# Patient Record
Sex: MALE | Race: WHITE
[De-identification: names, ages, dates, MRNs, and addresses within clinical notes are randomized per-mention and may not be internally consistent; named-entity substitution may affect disease eponyms.]

---
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
## 2023-08-16 ENCOUNTER — OUTPATIENT (OUTPATIENT)
Dept: OUTPATIENT SERVICES | Age: 18
LOS: 1 days | Discharge: HOME | End: 2023-08-16

## 2023-08-17 ENCOUNTER — APPOINTMENT (OUTPATIENT)
Dept: PEDIATRIC HEMATOLOGY/ONCOLOGY | Facility: CLINIC | Age: 18
End: 2023-08-17
Payer: COMMERCIAL

## 2023-08-17 ENCOUNTER — RESULT REVIEW (OUTPATIENT)
Age: 18
End: 2023-08-17

## 2023-08-17 VITALS
BODY MASS INDEX: 23.25 KG/M2 | RESPIRATION RATE: 18 BRPM | SYSTOLIC BLOOD PRESSURE: 118 MMHG | HEART RATE: 80 BPM | WEIGHT: 146.39 LBS | OXYGEN SATURATION: 98 % | TEMPERATURE: 98.24 F | HEIGHT: 66.46 IN | DIASTOLIC BLOOD PRESSURE: 67 MMHG

## 2023-08-17 DIAGNOSIS — E55.9 VITAMIN D DEFICIENCY, UNSPECIFIED: ICD-10-CM

## 2023-08-17 DIAGNOSIS — D72.821 MONOCYTOSIS (SYMPTOMATIC): ICD-10-CM

## 2023-08-17 DIAGNOSIS — C86.6 PRIMARY CUTANEOUS CD30-POSITIVE T-CELL PROLIFERATIONS: ICD-10-CM

## 2023-08-17 LAB
BASOPHILS # BLD AUTO: 0.09 K/UL — SIGNIFICANT CHANGE UP (ref 0–0.2)
BASOPHILS NFR BLD AUTO: 1.2 % — SIGNIFICANT CHANGE UP (ref 0–2)
EOSINOPHIL # BLD AUTO: 0.23 K/UL — SIGNIFICANT CHANGE UP (ref 0–0.5)
EOSINOPHIL NFR BLD AUTO: 2.9 % — SIGNIFICANT CHANGE UP (ref 0–6)
HCT VFR BLD CALC: 44.5 % — SIGNIFICANT CHANGE UP (ref 39–50)
HGB BLD-MCNC: 15.2 G/DL — SIGNIFICANT CHANGE UP (ref 13–17)
IANC: 3.7 K/UL — SIGNIFICANT CHANGE UP (ref 1.8–7.4)
IMM GRANULOCYTES NFR BLD AUTO: 3.5 % — HIGH (ref 0–0.9)
LYMPHOCYTES # BLD AUTO: 2.29 K/UL — SIGNIFICANT CHANGE UP (ref 1–3.3)
LYMPHOCYTES # BLD AUTO: 29.4 % — SIGNIFICANT CHANGE UP (ref 13–44)
MCHC RBC-ENTMCNC: 29.2 PG — SIGNIFICANT CHANGE UP (ref 27–34)
MCHC RBC-ENTMCNC: 34.2 GM/DL — SIGNIFICANT CHANGE UP (ref 32–36)
MCV RBC AUTO: 85.4 FL — SIGNIFICANT CHANGE UP (ref 80–100)
MONOCYTES # BLD AUTO: 1.22 K/UL — HIGH (ref 0–0.9)
MONOCYTES NFR BLD AUTO: 15.6 % — HIGH (ref 2–14)
NEUTROPHILS # BLD AUTO: 3.7 K/UL — SIGNIFICANT CHANGE UP (ref 1.8–7.4)
NEUTROPHILS NFR BLD AUTO: 47.4 % — SIGNIFICANT CHANGE UP (ref 43–77)
NRBC # BLD: 0 /100 WBCS — SIGNIFICANT CHANGE UP (ref 0–0)
PLATELET # BLD AUTO: 226 K/UL — SIGNIFICANT CHANGE UP (ref 150–400)
PMV BLD: 11.6 FL — SIGNIFICANT CHANGE UP (ref 7–13)
RBC # BLD: 5.21 M/UL — SIGNIFICANT CHANGE UP (ref 4.2–5.8)
RBC # FLD: 12.2 % — SIGNIFICANT CHANGE UP (ref 10.3–14.5)
WBC # BLD: 7.8 K/UL — SIGNIFICANT CHANGE UP (ref 3.8–10.5)
WBC # FLD AUTO: 7.8 K/UL — SIGNIFICANT CHANGE UP (ref 3.8–10.5)

## 2023-08-17 PROCEDURE — 99215 OFFICE O/P EST HI 40 MIN: CPT

## 2023-08-17 NOTE — PHYSICAL EXAM
[No focal deficits] : no focal deficits [Gait normal] : gait normal [Normal] : affect appropriate [100: Fully active, normal.] : 100: Fully active, normal. [de-identified] : Drew has scattered lesions on his back, abdomen, forehead, and lower legs that are mostly scare. Multiple scars.

## 2023-08-17 NOTE — HISTORY OF PRESENT ILLNESS
[de-identified] : \victor manuel Russell is being followed for lymphomatoid papulomatosis for which he was diagnosed at age 2.5 in 2006.\victor manuel Russell was referred to our clinic for reevaluation of his lymphomatoid papulosis in October 2011. He was originally diagnosed with this illness in March 2006. At the time of initial consultation his rash was extensive but was following the usual cyclic pattern of this disease. However, his family is very concerned regarding his diagnosis and would like to pursue any treatments available to improve his rash. We reviewed at length that lymphomatoid papulosis has an incidence of 1 per million. It generally has a chronic, indolent course. The cycles last 2-8 weeks are can last for decades. There are no associated constitutional symptoms. If any lesions remain ulcerated and do not cycle those lesions should be re-evaluated and possibly biopsied. Reportedly there is approximately 10% association with/ risk of malignancy with cutaneous anaplastic large cell lymphoma but treating the lesions does not change the natural history of the disease. Furthermore, there are currently no predictive features to define who is at higher risk for lymphoma. [de-identified] : Drew comes for routine annual follow up today. He reports throughout the past year his lesions have been stable and following the same pattern of flaring and going away.  no lesions stuck all seem to still change. No new allergies or medications. 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 studying computer science lots of questions about disease and risk of malignancy vitamin d 5000 IU weekly level was 24 at pmd  lesion onr right had was larger than usual but has now scared

## 2023-08-17 NOTE — CONSULT LETTER
[Dear  ___] : Dear  [unfilled], [Courtesy Letter:] : I had the pleasure of seeing your patient, [unfilled], in my office today. [Please see my note below.] : Please see my note below. [Consult Closing:] : Thank you very much for allowing me to participate in the care of this patient.  If you have any questions, please do not hesitate to contact me. [Sincerely,] : Sincerely, [DrNiles  ___] : Dr. HONEYCUTT [FreeTextEntry2] : Sapna Pat M.D.\par  [address] [facility] [address]\[place]  [address]\par  Tel.#: [phone]\par  Fax #[phone]\par   [FreeTextEntry3] : Adina Schwab, MEI, LASTC.\par  Physician Assistant/Clinical Care Coordinator\par  Pediatric Oncology Rare Tumor and Sarcoma (PORTS) Program\par  Vascular Anomalies Program and Oncology Focus Leukemia and Lymphoma\par  [facility]\par  [address]\[place] [address]\par  [email or web address]\par  \par  \par  Lizzie Marmolejo MD \par  Head, Pediatric Oncology Rare Tumor and Sarcoma Program\par  [facility] \par   of Pediatrics\par  [facility] [employer]\par  [email or web address]

## 2023-08-18 DIAGNOSIS — E55.9 VITAMIN D DEFICIENCY, UNSPECIFIED: ICD-10-CM

## 2023-08-18 DIAGNOSIS — C86.6 PRIMARY CUTANEOUS CD30-POSITIVE T-CELL PROLIFERATIONS: ICD-10-CM

## 2023-08-18 DIAGNOSIS — D72.821 MONOCYTOSIS (SYMPTOMATIC): ICD-10-CM

## 2024-08-12 ENCOUNTER — OUTPATIENT (OUTPATIENT)
Dept: OUTPATIENT SERVICES | Age: 19
LOS: 1 days | Discharge: HOME | End: 2024-08-12

## 2024-08-13 ENCOUNTER — APPOINTMENT (OUTPATIENT)
Dept: PEDIATRIC HEMATOLOGY/ONCOLOGY | Facility: CLINIC | Age: 19
End: 2024-08-13
Payer: COMMERCIAL

## 2024-08-13 ENCOUNTER — RESULT REVIEW (OUTPATIENT)
Age: 19
End: 2024-08-13

## 2024-08-13 VITALS
OXYGEN SATURATION: 98 % | HEART RATE: 70 BPM | TEMPERATURE: 97.7 F | BODY MASS INDEX: 23.81 KG/M2 | DIASTOLIC BLOOD PRESSURE: 58 MMHG | SYSTOLIC BLOOD PRESSURE: 105 MMHG | HEIGHT: 66.3 IN | WEIGHT: 148.15 LBS | RESPIRATION RATE: 20 BRPM

## 2024-08-13 DIAGNOSIS — C86.6 PRIMARY CUTANEOUS CD30-POSITIVE T-CELL PROLIFERATIONS: ICD-10-CM

## 2024-08-13 LAB
BASOPHILS # BLD AUTO: 0.07 K/UL — SIGNIFICANT CHANGE UP (ref 0–0.2)
BASOPHILS NFR BLD AUTO: 1.1 % — SIGNIFICANT CHANGE UP (ref 0–2)
EOSINOPHIL # BLD AUTO: 0.22 K/UL — SIGNIFICANT CHANGE UP (ref 0–0.5)
EOSINOPHIL NFR BLD AUTO: 3.3 % — SIGNIFICANT CHANGE UP (ref 0–6)
HCT VFR BLD CALC: 44.2 % — SIGNIFICANT CHANGE UP (ref 39–50)
HGB BLD-MCNC: 15.1 G/DL — SIGNIFICANT CHANGE UP (ref 13–17)
IANC: 3.66 K/UL — SIGNIFICANT CHANGE UP (ref 1.8–7.4)
IMM GRANULOCYTES NFR BLD AUTO: 1.5 % — HIGH (ref 0–0.9)
LYMPHOCYTES # BLD AUTO: 1.76 K/UL — SIGNIFICANT CHANGE UP (ref 1–3.3)
LYMPHOCYTES # BLD AUTO: 26.6 % — SIGNIFICANT CHANGE UP (ref 13–44)
MCHC RBC-ENTMCNC: 30.1 PG — SIGNIFICANT CHANGE UP (ref 27–34)
MCHC RBC-ENTMCNC: 34.2 GM/DL — SIGNIFICANT CHANGE UP (ref 32–36)
MCV RBC AUTO: 88.2 FL — SIGNIFICANT CHANGE UP (ref 80–100)
MONOCYTES # BLD AUTO: 0.81 K/UL — SIGNIFICANT CHANGE UP (ref 0–0.9)
MONOCYTES NFR BLD AUTO: 12.2 % — SIGNIFICANT CHANGE UP (ref 2–14)
NEUTROPHILS # BLD AUTO: 3.66 K/UL — SIGNIFICANT CHANGE UP (ref 1.8–7.4)
NEUTROPHILS NFR BLD AUTO: 55.3 % — SIGNIFICANT CHANGE UP (ref 43–77)
NRBC # BLD: 0 /100 WBCS — SIGNIFICANT CHANGE UP (ref 0–0)
PLATELET # BLD AUTO: 189 K/UL — SIGNIFICANT CHANGE UP (ref 150–400)
PMV BLD: 11.6 FL — SIGNIFICANT CHANGE UP (ref 7–13)
RBC # BLD: 5.01 M/UL — SIGNIFICANT CHANGE UP (ref 4.2–5.8)
RBC # FLD: 12.3 % — SIGNIFICANT CHANGE UP (ref 10.3–14.5)
WBC # BLD: 6.62 K/UL — SIGNIFICANT CHANGE UP (ref 3.8–10.5)
WBC # FLD AUTO: 6.62 K/UL — SIGNIFICANT CHANGE UP (ref 3.8–10.5)

## 2024-08-13 PROCEDURE — 99214 OFFICE O/P EST MOD 30 MIN: CPT

## 2024-08-15 NOTE — PHYSICAL EXAM
[No focal deficits] : no focal deficits [Gait normal] : gait normal [Normal] : affect appropriate [100: Fully active, normal.] : 100: Fully active, normal. [de-identified] : acne on the face vs papulosis and the back as well [de-identified] : Drew has scattered lesions on his back, abdomen, forehead, and lower legs that are mostly scars. Multiple scars. active lesion on clavicle and in arm pit and questionable eyelid

## 2024-08-15 NOTE — HISTORY OF PRESENT ILLNESS
[de-identified] : Drew comes for routine annual follow up today. graduated [employer] studying [address] science following with dermatology for acne gave oral medication minocycline but didn't tell derm about skin disorder recently started was on the minocycline in February  now with one on cheek that looks like Lp but previously face was usually clear feel like more outbreaks recently but each lesion is still cycling feels like 2 months ago that the lesions were the worse   [de-identified] : \victor manuel Russell is being followed for lymphomatoid papulomatosis for which he was diagnosed at age 2.5 in 2006.\victor manuel Russell was referred to our clinic for reevaluation of his lymphomatoid papulosis in October 2011. He was originally diagnosed with this illness in March 2006. At the time of initial consultation his rash was extensive but was following the usual cyclic pattern of this disease. However, his family is very concerned regarding his diagnosis and would like to pursue any treatments available to improve his rash. We reviewed at length that lymphomatoid papulosis has an incidence of 1 per million. It generally has a chronic, indolent course. The cycles last 2-8 weeks are can last for decades. There are no associated constitutional symptoms. If any lesions remain ulcerated and do not cycle those lesions should be re-evaluated and possibly biopsied. Reportedly there is approximately 10% association with/ risk of malignancy with cutaneous anaplastic large cell lymphoma but treating the lesions does not change the natural history of the disease. Furthermore, there are currently no predictive features to define who is at higher risk for lymphoma.

## 2024-08-15 NOTE — PHYSICAL EXAM
[No focal deficits] : no focal deficits [Gait normal] : gait normal [Normal] : affect appropriate [100: Fully active, normal.] : 100: Fully active, normal. [de-identified] : acne on the face vs papulosis and the back as well [de-identified] : Drew has scattered lesions on his back, abdomen, forehead, and lower legs that are mostly scars. Multiple scars. active lesion on clavicle and in arm pit and questionable eyelid

## 2024-08-15 NOTE — HISTORY OF PRESENT ILLNESS
[de-identified] : \victor manuel Russell is being followed for lymphomatoid papulomatosis for which he was diagnosed at age 2.5 in 2006.\victor manuel Russell was referred to our clinic for reevaluation of his lymphomatoid papulosis in October 2011. He was originally diagnosed with this illness in March 2006. At the time of initial consultation his rash was extensive but was following the usual cyclic pattern of this disease. However, his family is very concerned regarding his diagnosis and would like to pursue any treatments available to improve his rash. We reviewed at length that lymphomatoid papulosis has an incidence of 1 per million. It generally has a chronic, indolent course. The cycles last 2-8 weeks are can last for decades. There are no associated constitutional symptoms. If any lesions remain ulcerated and do not cycle those lesions should be re-evaluated and possibly biopsied. Reportedly there is approximately 10% association with/ risk of malignancy with cutaneous anaplastic large cell lymphoma but treating the lesions does not change the natural history of the disease. Furthermore, there are currently no predictive features to define who is at higher risk for lymphoma. [de-identified] : Drew comes for routine annual follow up today. graduated [employer] studying [employer] following with dermatology for acne gave oral medication minocycline but didn't tell derm about skin disorder recently started was on the minocycline in February  now with one on cheek that looks like Lp but previously face was usually clear feel like more outbreaks recently but each lesion is still cycling feels like 2 months ago that the lesions were the worse

## 2024-08-15 NOTE — CONSULT LETTER
[Dear  ___] : Dear  [unfilled], [Courtesy Letter:] : I had the pleasure of seeing your patient, [unfilled], in my office today. [Please see my note below.] : Please see my note below. [Consult Closing:] : Thank you very much for allowing me to participate in the care of this patient.  If you have any questions, please do not hesitate to contact me. [Sincerely,] : Sincerely, [FreeTextEntry2] : Nori Mcdermott MD [FreeTextEntry3] : Lizzie Marmolejo MD  Head, Pediatric Oncology Solid Tumor Program [facility]   of Pediatrics [facility] [employer] at [email or web address]

## 2024-08-15 NOTE — CONSULT LETTER
[Dear  ___] : Dear  [unfilled], [Courtesy Letter:] : I had the pleasure of seeing your patient, [unfilled], in my office today. [Please see my note below.] : Please see my note below. [Consult Closing:] : Thank you very much for allowing me to participate in the care of this patient.  If you have any questions, please do not hesitate to contact me. [Sincerely,] : Sincerely, [FreeTextEntry2] : Nori Mcdermott MD [FreeTextEntry3] : Lizzie Marmolejo MD  Head, Pediatric Oncology Solid Tumor Program [facility]   of Pediatrics [facility] [employer] at [facility]/Symone [email or web address]

## 2024-08-16 DIAGNOSIS — C86.6 PRIMARY CUTANEOUS CD30-POSITIVE T-CELL PROLIFERATIONS: ICD-10-CM

## 2024-09-03 ENCOUNTER — NON-APPOINTMENT (OUTPATIENT)
Age: 19
End: 2024-09-03

## 2024-09-04 ENCOUNTER — APPOINTMENT (OUTPATIENT)
Dept: DERMATOLOGY | Facility: CLINIC | Age: 19
End: 2024-09-04
Payer: COMMERCIAL

## 2024-09-04 DIAGNOSIS — L70.9 ACNE, UNSPECIFIED: ICD-10-CM

## 2024-09-04 PROCEDURE — G2211 COMPLEX E/M VISIT ADD ON: CPT

## 2024-09-04 PROCEDURE — 99205 OFFICE O/P NEW HI 60 MIN: CPT

## 2024-09-04 RX ORDER — ISOTRETINOIN 30 MG/1
30 CAPSULE ORAL DAILY
Qty: 1 | Refills: 0 | Status: ACTIVE | COMMUNITY
Start: 2024-09-04 | End: 1900-01-01

## 2024-09-08 NOTE — HISTORY OF PRESENT ILLNESS
[FreeTextEntry1] : np [de-identified] : 17 yo M with longstanding hx of LyP since early childhood (diagnosed March 2006), s/p multiple skin biopsies most recently in 2011, prev followed with Dr. Arriaga, here to establish care.   Has tried topical steroids in the past and they never helped. Continues to get scattered lesions throughout the body. Overall feels well  Also with acne resulting in scarring. tried tretinoin in the past but it didnt help.

## 2024-09-08 NOTE — PHYSICAL EXAM
[FreeTextEntry3] : on the arms, trunk and legs are scattered pink crusted papules, some clustered together, in various stages of healing, no LAD, no HSM  face with inflammatory papules and scarring

## 2024-09-08 NOTE — ASSESSMENT
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 Decision made to trial oral retinoid (for acne, see below) as there have been some sucessfull reports in treating LyP.  May strongly consider nbUVB if not responding to accutane Might need repeat biopsy to monitor for transformation, needs TBSE at least 1-2 times a year Photos taken, will likely refer to Dr. Abbott after treating acne (see below)  #acne, scarring, inflammatory - We have discussed the nature and course of this condition. Diagnosis and treatment options discussed, favor systemic medication with isotretinoin.   - IPLEDGE       - Patient weight: 67 kg      - aim for cumulative dose 120-150 mg/kg.  Occasionally,higher cumulative doses (closer > 220 mg/kg) are required for clearance)      -START isotretinoin 30mg daily      - Pt understands to STOP all acne topicals while taking isotretinoin  #. High Risk Medication Monitoring: - hepatic panel, lipid panel reviewed (scanned into chart) - We discussed the risks/benefits/alternatives of isotretinoin therapy. The risks include but are not limited to dryness of skin/mucosa, nose bleeds, hair loss, headaches, changes in vision, bone or muscle pain, elevation in triglycerides, inflammation of the liver, abnormalities of the bone marrow, bowel disease, and changes in mood which can be associated with suicidal ideation. The teratogenic risks of oral retinoid therapy was discussed. The importance of and necessity for two forms of birth control or abstinence during the course of treatment and for at least one month after the last medication exposure was discussed. Patient signed the Ipledge consent prior to initiation of treatment.  >60 min spent with pt, reviewing history and outside records and counseling

## 2024-10-08 ENCOUNTER — APPOINTMENT (OUTPATIENT)
Dept: DERMATOLOGY | Facility: CLINIC | Age: 19
End: 2024-10-08